# Patient Record
Sex: FEMALE | Race: WHITE
[De-identification: names, ages, dates, MRNs, and addresses within clinical notes are randomized per-mention and may not be internally consistent; named-entity substitution may affect disease eponyms.]

---

## 2020-03-02 ENCOUNTER — HOSPITAL ENCOUNTER (INPATIENT)
Dept: HOSPITAL 90 - EDH | Age: 80
LOS: 4 days | Discharge: NURSING, REHAB OR LONG-TERM CARE | DRG: 470 | End: 2020-03-06
Attending: INTERNAL MEDICINE | Admitting: INTERNAL MEDICINE
Payer: MEDICARE

## 2020-03-02 VITALS — HEIGHT: 63 IN | WEIGHT: 156 LBS | BODY MASS INDEX: 27.64 KG/M2

## 2020-03-02 DIAGNOSIS — B96.20: ICD-10-CM

## 2020-03-02 DIAGNOSIS — N39.0: ICD-10-CM

## 2020-03-02 DIAGNOSIS — S72.002A: Primary | ICD-10-CM

## 2020-03-02 DIAGNOSIS — E03.9: ICD-10-CM

## 2020-03-02 DIAGNOSIS — Z88.1: ICD-10-CM

## 2020-03-02 DIAGNOSIS — Z79.899: ICD-10-CM
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
## 2020-03-02 DIAGNOSIS — E66.9: ICD-10-CM

## 2020-03-02 DIAGNOSIS — R55: ICD-10-CM

## 2020-03-02 DIAGNOSIS — Z53.20: ICD-10-CM

## 2020-03-02 DIAGNOSIS — F41.1: ICD-10-CM
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
## 2020-03-02 DIAGNOSIS — I10: ICD-10-CM
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
## 2020-03-02 DIAGNOSIS — Z16.23: ICD-10-CM

## 2020-03-02 LAB
ALBUMIN SERPL-MCNC: 3.6 G/DL (ref 3.5–5)
ALT SERPL-CCNC: 17 U/L (ref 12–78)
APTT PPP: 24.8 SEC (ref 26.3–35.5)
AST SERPL-CCNC: 20 U/L (ref 10–37)
BASOPHILS NFR BLD AUTO: 0.5 % (ref 0–5)
BILIRUB SERPL-MCNC: 0.2 MG/DL (ref 0.2–1)
BUN SERPL-MCNC: 15 MG/DL (ref 7–18)
CHLORIDE SERPL-SCNC: 101 MMOL/L (ref 101–111)
CK SERPL-CCNC: 80 U/L (ref 21–232)
CO2 SERPL-SCNC: 30 MMOL/L (ref 21–32)
CREAT SERPL-MCNC: 1 MG/DL (ref 0.5–1.5)
EOSINOPHIL NFR BLD AUTO: 2.2 % (ref 0–8)
ERYTHROCYTE [DISTWIDTH] IN BLOOD BY AUTOMATED COUNT: 13.2 % (ref 11–15.5)
GFR SERPL CREATININE-BSD FRML MDRD: 57 ML/MIN (ref 60–?)
GLUCOSE SERPL-MCNC: 100 MG/DL (ref 70–105)
HCT VFR BLD AUTO: 37.6 % (ref 36–48)
INR PPP: 0.95 (ref 0.85–1.15)
KETONES UR STRIP-MCNC: 15 MG/DL
LYMPHOCYTES NFR SPEC AUTO: 36.6 % (ref 21–51)
MCH RBC QN AUTO: 29.2 PG (ref 27–33)
MCHC RBC AUTO-ENTMCNC: 32.2 G/DL (ref 32–36)
MCV RBC AUTO: 90.8 FL (ref 79–99)
MONOCYTES NFR BLD AUTO: 9.2 % (ref 3–13)
NEUTROPHILS NFR BLD AUTO: 51.2 % (ref 40–77)
NRBC BLD MANUAL-RTO: 0 % (ref 0–0.19)
PH UR STRIP: 7.5 [PH] (ref 5–8)
PLATELET # BLD AUTO: 208 K/UL (ref 130–400)
POTASSIUM SERPL-SCNC: 3.6 MMOL/L (ref 3.5–5.1)
PROT SERPL-MCNC: 7.2 G/DL (ref 6–8.3)
PROTHROMBIN TIME: 10 SEC (ref 9.6–11.6)
RBC # BLD AUTO: 4.14 MIL/UL (ref 4–5.5)
RBC #/AREA URNS HPF: (no result) /HPF (ref 0–1)
SODIUM SERPL-SCNC: 138 MMOL/L (ref 136–145)
SP GR UR STRIP: 1.01 (ref 1–1.03)
UROBILINOGEN UR STRIP-MCNC: 0.2 MG/DL (ref 0.2–1)
WBC # BLD AUTO: 6.5 K/UL (ref 4.8–10.8)
WBC #/AREA URNS HPF: (no result) /HPF (ref 0–1)

## 2020-03-02 PROCEDURE — 81001 URINALYSIS AUTO W/SCOPE: CPT

## 2020-03-02 PROCEDURE — 86850 RBC ANTIBODY SCREEN: CPT

## 2020-03-02 PROCEDURE — 86900 BLOOD TYPING SEROLOGIC ABO: CPT

## 2020-03-02 PROCEDURE — 87077 CULTURE AEROBIC IDENTIFY: CPT

## 2020-03-02 PROCEDURE — 97039 UNLISTED MODALITY: CPT

## 2020-03-02 PROCEDURE — 80053 COMPREHEN METABOLIC PANEL: CPT

## 2020-03-02 PROCEDURE — 86901 BLOOD TYPING SEROLOGIC RH(D): CPT

## 2020-03-02 PROCEDURE — 85610 PROTHROMBIN TIME: CPT

## 2020-03-02 PROCEDURE — 73503 X-RAY EXAM HIP UNI 4/> VIEWS: CPT

## 2020-03-02 PROCEDURE — 71045 X-RAY EXAM CHEST 1 VIEW: CPT

## 2020-03-02 PROCEDURE — 87186 SC STD MICRODIL/AGAR DIL: CPT

## 2020-03-02 PROCEDURE — 84443 ASSAY THYROID STIM HORMONE: CPT

## 2020-03-02 PROCEDURE — 85027 COMPLETE CBC AUTOMATED: CPT

## 2020-03-02 PROCEDURE — 87088 URINE BACTERIA CULTURE: CPT

## 2020-03-02 PROCEDURE — 93005 ELECTROCARDIOGRAM TRACING: CPT

## 2020-03-02 PROCEDURE — 80048 BASIC METABOLIC PNL TOTAL CA: CPT

## 2020-03-02 PROCEDURE — 85730 THROMBOPLASTIN TIME PARTIAL: CPT

## 2020-03-02 PROCEDURE — 88304 TISSUE EXAM BY PATHOLOGIST: CPT

## 2020-03-02 PROCEDURE — 82550 ASSAY OF CK (CPK): CPT

## 2020-03-02 PROCEDURE — 84484 ASSAY OF TROPONIN QUANT: CPT

## 2020-03-02 PROCEDURE — 85025 COMPLETE CBC W/AUTO DIFF WBC: CPT

## 2020-03-02 PROCEDURE — 88311 DECALCIFY TISSUE: CPT

## 2020-03-02 PROCEDURE — 36415 COLL VENOUS BLD VENIPUNCTURE: CPT

## 2020-03-02 PROCEDURE — 73502 X-RAY EXAM HIP UNI 2-3 VIEWS: CPT

## 2020-03-03 VITALS — DIASTOLIC BLOOD PRESSURE: 78 MMHG | SYSTOLIC BLOOD PRESSURE: 163 MMHG

## 2020-03-03 VITALS — DIASTOLIC BLOOD PRESSURE: 55 MMHG | SYSTOLIC BLOOD PRESSURE: 135 MMHG

## 2020-03-03 VITALS — DIASTOLIC BLOOD PRESSURE: 82 MMHG | SYSTOLIC BLOOD PRESSURE: 167 MMHG

## 2020-03-03 VITALS — SYSTOLIC BLOOD PRESSURE: 167 MMHG | DIASTOLIC BLOOD PRESSURE: 84 MMHG

## 2020-03-03 VITALS — SYSTOLIC BLOOD PRESSURE: 145 MMHG | DIASTOLIC BLOOD PRESSURE: 59 MMHG

## 2020-03-03 VITALS — SYSTOLIC BLOOD PRESSURE: 149 MMHG | DIASTOLIC BLOOD PRESSURE: 65 MMHG

## 2020-03-03 VITALS — SYSTOLIC BLOOD PRESSURE: 162 MMHG | DIASTOLIC BLOOD PRESSURE: 74 MMHG

## 2020-03-03 RX ADMIN — FAMOTIDINE SCH MG: 20 TABLET, FILM COATED ORAL at 10:34

## 2020-03-03 RX ADMIN — MORPHINE SULFATE PRN MG: 2 INJECTION, SOLUTION INTRAMUSCULAR; INTRAVENOUS at 15:22

## 2020-03-03 RX ADMIN — ATENOLOL SCH MG: 25 TABLET ORAL at 10:34

## 2020-03-03 RX ADMIN — KETOROLAC TROMETHAMINE PRN MG: 30 INJECTION, SOLUTION INTRAMUSCULAR; INTRAVENOUS at 05:59

## 2020-03-03 RX ADMIN — SODIUM CHLORIDE SCH GM: 9 INJECTION, SOLUTION INTRAVENOUS at 12:12

## 2020-03-03 RX ADMIN — Medication SCH MG: at 20:03

## 2020-03-03 RX ADMIN — MORPHINE SULFATE PRN MG: 2 INJECTION, SOLUTION INTRAMUSCULAR; INTRAVENOUS at 20:06

## 2020-03-03 RX ADMIN — KETOROLAC TROMETHAMINE PRN MG: 30 INJECTION, SOLUTION INTRAMUSCULAR; INTRAVENOUS at 14:31

## 2020-03-03 RX ADMIN — ATENOLOL SCH MG: 25 TABLET ORAL at 20:04

## 2020-03-03 RX ADMIN — Medication SCH MG: at 10:34

## 2020-03-03 NOTE — NUR
ROUNDS

PATIENT AWAKE AND ALERT IN BED WATCHING TV WITH SPOUSE AT BEDSIDE. MEDICATED FOR COMPLAINTS 
OF PAIN VOICED AT THIS TIME TO LEFT HIP. PATIENT STATES SHE WANTS TO BE MEDICATED SO HER 
PAIN DOES NOT GET WORSE.  RESP EVEN AND UNLABORED. NO SOB NOTED. ON ROOM AIR. TOLERATING PO 
DIET WELL. NO NAUSEA OR VOMITING NOTED. BEDREST. FALL PRECAUTIONS IN PLACE. INITIAL DOSE OF 
LOVENOX GIVEN. PATIENT VERBALIZED UNDERSTANDING OF MEDICATION GIVEN. LEES PATENT AND 
DRAINING CLEAR PALE YELLOW URINE. NO SIGNS OF DISTRESS NOTED. CALL LIGHT WITHIN REACH. WILL 
CONTINUE TO MONITOR. 

-------------------------------------------------------------------------------

Addendum: 03/04/20 at 0051 by MARCELL PALACIOS RN RN

-------------------------------------------------------------------------------

Amended: Links added.

## 2020-03-03 NOTE — NUR
PATIENT RECEIVED FROM ER WITH A DIAGNOSIS OF A LEFT HIP FRACTURE, S/P FALL INJURY, AND ACUTE 
UTI UNDER THE CARE OF . PATIENT AWAKE AND ALERT. VOICES ALL NEEDS. NO 
COMPLAINTS OF PAIN VOICED AT THIS TIME. VITALS STABLE. AFEBRILE. RESP EVEN AND UNLABORED. NO 
SOB NOTED. ON ROOM AIR. 18 FR LEES CATHETER PATENT AND DRAINING CLEAR PALE YELLOW URINE TO 
BSD. TELEMETRY MONITOR ON. NORMAL SINUS RHYTHM. HEAD TO TOE ASSESSMENT DONE. SPOUSE AT 
BEDSIDE. BEDREST. FALL PRECAUTIONS IN PLACE DUE TO POOR SAFETY AWARENESS. CALL LIGHT WITHIN 
REACH. WILL CONTINUE TO MONITOR.  

-------------------------------------------------------------------------------

Addendum: 03/03/20 at 0657 by MARCELL PALACIOS RN RN

-------------------------------------------------------------------------------

Amended: Links added.

## 2020-03-03 NOTE — NUR
CM NOTE- DC PLANNING

Met w patient and spouse Kaden  for dc planning. face sheet verified - corrections 
made/faxed

patient   and jared are from [facility] near [place], returning end  of April. They live in 
[facility], home has 3  steps up to home- pt states she is independent, active, 
drives occaisionally, and uses no mobility aids. 



Pt with fractured hip, "may need a replacement' as per patient,

Advised both re recommendations for rehab post procedure.  

[facility] would be first choice;tagged chart. Will [place] back and revisit after surgery. 

-------------------------------------------------------------------------------

Addendum: 03/03/20 at 1347 by SORIN HAN RN CM

-------------------------------------------------------------------------------

Amended: Links added.

## 2020-03-04 VITALS — DIASTOLIC BLOOD PRESSURE: 53 MMHG | SYSTOLIC BLOOD PRESSURE: 132 MMHG

## 2020-03-04 VITALS — SYSTOLIC BLOOD PRESSURE: 131 MMHG | DIASTOLIC BLOOD PRESSURE: 52 MMHG

## 2020-03-04 VITALS — DIASTOLIC BLOOD PRESSURE: 70 MMHG | SYSTOLIC BLOOD PRESSURE: 142 MMHG

## 2020-03-04 VITALS — SYSTOLIC BLOOD PRESSURE: 134 MMHG | DIASTOLIC BLOOD PRESSURE: 63 MMHG

## 2020-03-04 VITALS — DIASTOLIC BLOOD PRESSURE: 63 MMHG | SYSTOLIC BLOOD PRESSURE: 151 MMHG

## 2020-03-04 VITALS — SYSTOLIC BLOOD PRESSURE: 143 MMHG | DIASTOLIC BLOOD PRESSURE: 52 MMHG

## 2020-03-04 VITALS — DIASTOLIC BLOOD PRESSURE: 61 MMHG | SYSTOLIC BLOOD PRESSURE: 142 MMHG

## 2020-03-04 VITALS — SYSTOLIC BLOOD PRESSURE: 130 MMHG | DIASTOLIC BLOOD PRESSURE: 45 MMHG

## 2020-03-04 VITALS — DIASTOLIC BLOOD PRESSURE: 34 MMHG | SYSTOLIC BLOOD PRESSURE: 103 MMHG

## 2020-03-04 VITALS — SYSTOLIC BLOOD PRESSURE: 136 MMHG | DIASTOLIC BLOOD PRESSURE: 73 MMHG

## 2020-03-04 VITALS — DIASTOLIC BLOOD PRESSURE: 66 MMHG | SYSTOLIC BLOOD PRESSURE: 133 MMHG

## 2020-03-04 VITALS — SYSTOLIC BLOOD PRESSURE: 145 MMHG | DIASTOLIC BLOOD PRESSURE: 68 MMHG

## 2020-03-04 VITALS — SYSTOLIC BLOOD PRESSURE: 131 MMHG | DIASTOLIC BLOOD PRESSURE: 54 MMHG

## 2020-03-04 VITALS — DIASTOLIC BLOOD PRESSURE: 38 MMHG | SYSTOLIC BLOOD PRESSURE: 112 MMHG

## 2020-03-04 VITALS — DIASTOLIC BLOOD PRESSURE: 70 MMHG | SYSTOLIC BLOOD PRESSURE: 134 MMHG

## 2020-03-04 VITALS — DIASTOLIC BLOOD PRESSURE: 66 MMHG | SYSTOLIC BLOOD PRESSURE: 150 MMHG

## 2020-03-04 VITALS — SYSTOLIC BLOOD PRESSURE: 130 MMHG | DIASTOLIC BLOOD PRESSURE: 47 MMHG

## 2020-03-04 VITALS — DIASTOLIC BLOOD PRESSURE: 62 MMHG | SYSTOLIC BLOOD PRESSURE: 146 MMHG

## 2020-03-04 VITALS — DIASTOLIC BLOOD PRESSURE: 49 MMHG | SYSTOLIC BLOOD PRESSURE: 129 MMHG

## 2020-03-04 VITALS — SYSTOLIC BLOOD PRESSURE: 127 MMHG | DIASTOLIC BLOOD PRESSURE: 46 MMHG

## 2020-03-04 VITALS — DIASTOLIC BLOOD PRESSURE: 58 MMHG | SYSTOLIC BLOOD PRESSURE: 141 MMHG

## 2020-03-04 VITALS — SYSTOLIC BLOOD PRESSURE: 135 MMHG | DIASTOLIC BLOOD PRESSURE: 47 MMHG

## 2020-03-04 VITALS — DIASTOLIC BLOOD PRESSURE: 39 MMHG | SYSTOLIC BLOOD PRESSURE: 115 MMHG

## 2020-03-04 VITALS — SYSTOLIC BLOOD PRESSURE: 134 MMHG | DIASTOLIC BLOOD PRESSURE: 49 MMHG

## 2020-03-04 VITALS — DIASTOLIC BLOOD PRESSURE: 46 MMHG | SYSTOLIC BLOOD PRESSURE: 133 MMHG

## 2020-03-04 LAB
ALBUMIN SERPL-MCNC: 2.9 G/DL (ref 3.5–5)
ALT SERPL-CCNC: 16 U/L (ref 12–78)
AST SERPL-CCNC: 16 U/L (ref 10–37)
BASOPHILS NFR BLD AUTO: 0.3 % (ref 0–5)
BILIRUB SERPL-MCNC: 0.5 MG/DL (ref 0.2–1)
BUN SERPL-MCNC: 14 MG/DL (ref 7–18)
CHLORIDE SERPL-SCNC: 94 MMOL/L (ref 101–111)
CO2 SERPL-SCNC: 29 MMOL/L (ref 21–32)
CREAT SERPL-MCNC: 0.8 MG/DL (ref 0.5–1.5)
EOSINOPHIL NFR BLD AUTO: 1.9 % (ref 0–8)
ERYTHROCYTE [DISTWIDTH] IN BLOOD BY AUTOMATED COUNT: 13 % (ref 11–15.5)
GFR SERPL CREATININE-BSD FRML MDRD: 74 ML/MIN (ref 60–?)
GLUCOSE SERPL-MCNC: 86 MG/DL (ref 70–105)
HCT VFR BLD AUTO: 35 % (ref 36–48)
LYMPHOCYTES NFR SPEC AUTO: 24.2 % (ref 21–51)
MCH RBC QN AUTO: 29.2 PG (ref 27–33)
MCHC RBC AUTO-ENTMCNC: 33.1 G/DL (ref 32–36)
MCV RBC AUTO: 88.2 FL (ref 79–99)
MONOCYTES NFR BLD AUTO: 8.3 % (ref 3–13)
NEUTROPHILS NFR BLD AUTO: 65 % (ref 40–77)
NRBC BLD MANUAL-RTO: 0 % (ref 0–0.19)
PLATELET # BLD AUTO: 201 K/UL (ref 130–400)
POTASSIUM SERPL-SCNC: 3.2 MMOL/L (ref 3.5–5.1)
PROT SERPL-MCNC: 6.3 G/DL (ref 6–8.3)
RBC # BLD AUTO: 3.97 MIL/UL (ref 4–5.5)
SODIUM SERPL-SCNC: 131 MMOL/L (ref 136–145)
TSH SERPL DL<=0.05 MIU/L-ACNC: 10.59 UIU/ML (ref 0.36–3.74)
WBC # BLD AUTO: 6.2 K/UL (ref 4.8–10.8)

## 2020-03-04 PROCEDURE — 3E0T3BZ INTRODUCTION OF ANESTHETIC AGENT INTO PERIPHERAL NERVES AND PLEXI, PERCUTANEOUS APPROACH: ICD-10-PCS | Performed by: ORTHOPAEDIC SURGERY

## 2020-03-04 PROCEDURE — 0SRS0J9 REPLACEMENT OF LEFT HIP JOINT, FEMORAL SURFACE WITH SYNTHETIC SUBSTITUTE, CEMENTED, OPEN APPROACH: ICD-10-PCS | Performed by: ORTHOPAEDIC SURGERY

## 2020-03-04 RX ADMIN — SODIUM CHLORIDE SCH MLS/HR: 0.9 INJECTION, SOLUTION INTRAVENOUS at 23:14

## 2020-03-04 RX ADMIN — FAMOTIDINE SCH MG: 20 TABLET, FILM COATED ORAL at 09:00

## 2020-03-04 RX ADMIN — ACETAMINOPHEN SCH MG: 500 TABLET, COATED ORAL at 23:14

## 2020-03-04 RX ADMIN — SODIUM CHLORIDE PRN GM: 9 INJECTION, SOLUTION INTRAVENOUS at 09:56

## 2020-03-04 RX ADMIN — Medication SCH MG: at 09:00

## 2020-03-04 RX ADMIN — ATENOLOL SCH MG: 25 TABLET ORAL at 20:04

## 2020-03-04 RX ADMIN — Medication SCH MG: at 20:04

## 2020-03-04 RX ADMIN — CELECOXIB SCH MG: 200 CAPSULE ORAL at 20:04

## 2020-03-04 RX ADMIN — SODIUM CHLORIDE SCH GM: 9 INJECTION, SOLUTION INTRAVENOUS at 20:04

## 2020-03-04 RX ADMIN — SODIUM CHLORIDE SCH MLS/HR: 0.9 INJECTION, SOLUTION INTRAVENOUS at 14:31

## 2020-03-04 RX ADMIN — SODIUM CHLORIDE PRN GM: 9 INJECTION, SOLUTION INTRAVENOUS at 11:57

## 2020-03-04 RX ADMIN — POTASSIUM CHLORIDE PRN MEQ: 1500 TABLET, EXTENDED RELEASE ORAL at 23:16

## 2020-03-04 RX ADMIN — SODIUM CHLORIDE SCH GM: 9 INJECTION, SOLUTION INTRAVENOUS at 01:41

## 2020-03-04 RX ADMIN — SODIUM CHLORIDE SCH GM: 9 INJECTION, SOLUTION INTRAVENOUS at 13:00

## 2020-03-04 RX ADMIN — ASPIRIN SCH MG: 81 TABLET, CHEWABLE ORAL at 20:07

## 2020-03-04 RX ADMIN — SODIUM CHLORIDE SCH GM: 9 INJECTION, SOLUTION INTRAVENOUS at 13:08

## 2020-03-04 RX ADMIN — ATENOLOL SCH MG: 25 TABLET ORAL at 09:00

## 2020-03-04 RX ADMIN — POTASSIUM CHLORIDE PRN MEQ: 1500 TABLET, EXTENDED RELEASE ORAL at 15:49

## 2020-03-04 RX ADMIN — PREGABALIN SCH MG: 25 CAPSULE ORAL at 20:04

## 2020-03-04 RX ADMIN — FAMOTIDINE SCH MG: 20 TABLET, FILM COATED ORAL at 20:04

## 2020-03-04 RX ADMIN — MORPHINE SULFATE PRN MG: 2 INJECTION, SOLUTION INTRAMUSCULAR; INTRAVENOUS at 02:06

## 2020-03-04 RX ADMIN — MORPHINE SULFATE PRN MG: 2 INJECTION, SOLUTION INTRAMUSCULAR; INTRAVENOUS at 15:48

## 2020-03-04 RX ADMIN — ACETAMINOPHEN SCH MG: 500 TABLET, COATED ORAL at 15:57

## 2020-03-04 NOTE — NUR
TRANSFERRED TO [facility] FOR SURGERY BY MATTHEW TURNER AND TECH

PT IN NO DISTRESS, RESP EVEN, UNLABORED. REMOVED DENTURES AND GLASSES, LEFT AT BEDSIDE. 
 ACCOMPANIED PT.

## 2020-03-04 NOTE — NUR
RE:  ON CALL IVPBs ORDERED - CALLED AND NOTIFIED JOHNNY CASTRO

and notified of Ancef and Tranexamic Acid on call IVPB pending to give as orderd. MATTHEW 
VERBALIZED UNDERSTANDING.

## 2020-03-05 VITALS — SYSTOLIC BLOOD PRESSURE: 120 MMHG | DIASTOLIC BLOOD PRESSURE: 54 MMHG

## 2020-03-05 VITALS — DIASTOLIC BLOOD PRESSURE: 63 MMHG | SYSTOLIC BLOOD PRESSURE: 135 MMHG

## 2020-03-05 VITALS — DIASTOLIC BLOOD PRESSURE: 30 MMHG | SYSTOLIC BLOOD PRESSURE: 127 MMHG

## 2020-03-05 VITALS — DIASTOLIC BLOOD PRESSURE: 48 MMHG | SYSTOLIC BLOOD PRESSURE: 92 MMHG

## 2020-03-05 VITALS — SYSTOLIC BLOOD PRESSURE: 133 MMHG | DIASTOLIC BLOOD PRESSURE: 59 MMHG

## 2020-03-05 VITALS — SYSTOLIC BLOOD PRESSURE: 125 MMHG | DIASTOLIC BLOOD PRESSURE: 62 MMHG

## 2020-03-05 LAB
BUN SERPL-MCNC: 9 MG/DL (ref 7–18)
CHLORIDE SERPL-SCNC: 104 MMOL/L (ref 101–111)
CO2 SERPL-SCNC: 25 MMOL/L (ref 21–32)
CREAT SERPL-MCNC: 0.7 MG/DL (ref 0.5–1.5)
ERYTHROCYTE [DISTWIDTH] IN BLOOD BY AUTOMATED COUNT: 13.1 % (ref 11–15.5)
GFR SERPL CREATININE-BSD FRML MDRD: 86 ML/MIN (ref 60–?)
GLUCOSE SERPL-MCNC: 147 MG/DL (ref 70–105)
HCT VFR BLD AUTO: 31.2 % (ref 36–48)
MCH RBC QN AUTO: 29.3 PG (ref 27–33)
MCHC RBC AUTO-ENTMCNC: 33 G/DL (ref 32–36)
MCV RBC AUTO: 88.9 FL (ref 79–99)
NRBC BLD MANUAL-RTO: 0 % (ref 0–0.19)
PLATELET # BLD AUTO: 174 K/UL (ref 130–400)
POTASSIUM SERPL-SCNC: 4.4 MMOL/L (ref 3.5–5.1)
RBC # BLD AUTO: 3.51 MIL/UL (ref 4–5.5)
SODIUM SERPL-SCNC: 136 MMOL/L (ref 136–145)
WBC # BLD AUTO: 8.7 K/UL (ref 4.8–10.8)

## 2020-03-05 RX ADMIN — SODIUM CHLORIDE SCH MLS/HR: 0.9 INJECTION, SOLUTION INTRAVENOUS at 10:31

## 2020-03-05 RX ADMIN — CALCIUM PRN MG: 500 TABLET ORAL at 23:14

## 2020-03-05 RX ADMIN — ATENOLOL SCH MG: 25 TABLET ORAL at 08:43

## 2020-03-05 RX ADMIN — FAMOTIDINE SCH MG: 20 TABLET, FILM COATED ORAL at 19:44

## 2020-03-05 RX ADMIN — SODIUM CHLORIDE SCH GM: 9 INJECTION, SOLUTION INTRAVENOUS at 12:29

## 2020-03-05 RX ADMIN — CELECOXIB SCH MG: 200 CAPSULE ORAL at 08:43

## 2020-03-05 RX ADMIN — PREGABALIN SCH MG: 25 CAPSULE ORAL at 08:42

## 2020-03-05 RX ADMIN — CALCIUM PRN MG: 500 TABLET ORAL at 12:29

## 2020-03-05 RX ADMIN — Medication SCH MG: at 08:43

## 2020-03-05 RX ADMIN — SODIUM CHLORIDE SCH GM: 9 INJECTION, SOLUTION INTRAVENOUS at 04:05

## 2020-03-05 RX ADMIN — Medication SCH GM: at 08:43

## 2020-03-05 RX ADMIN — ACETAMINOPHEN SCH MG: 500 TABLET, COATED ORAL at 06:29

## 2020-03-05 RX ADMIN — ASPIRIN SCH MG: 81 TABLET, CHEWABLE ORAL at 08:40

## 2020-03-05 RX ADMIN — SODIUM CHLORIDE SCH GM: 9 INJECTION, SOLUTION INTRAVENOUS at 23:14

## 2020-03-05 RX ADMIN — ACETAMINOPHEN SCH MG: 500 TABLET, COATED ORAL at 13:07

## 2020-03-05 RX ADMIN — ATENOLOL SCH MG: 25 TABLET ORAL at 21:00

## 2020-03-05 RX ADMIN — ACETAMINOPHEN SCH MG: 500 TABLET, COATED ORAL at 22:47

## 2020-03-05 RX ADMIN — OXYCODONE HYDROCHLORIDE PRN MG: 5 TABLET ORAL at 13:06

## 2020-03-05 RX ADMIN — ASPIRIN SCH MG: 81 TABLET, CHEWABLE ORAL at 19:44

## 2020-03-05 RX ADMIN — CELECOXIB SCH MG: 200 CAPSULE ORAL at 19:44

## 2020-03-05 RX ADMIN — FAMOTIDINE SCH MG: 20 TABLET, FILM COATED ORAL at 08:48

## 2020-03-05 RX ADMIN — LEVOTHYROXINE SODIUM SCH MCG: 25 TABLET ORAL at 06:28

## 2020-03-05 RX ADMIN — CALCIUM PRN MG: 500 TABLET ORAL at 22:47

## 2020-03-05 RX ADMIN — FAMOTIDINE SCH MG: 20 TABLET, FILM COATED ORAL at 08:43

## 2020-03-05 RX ADMIN — Medication SCH MG: at 19:45

## 2020-03-05 RX ADMIN — PREGABALIN SCH MG: 25 CAPSULE ORAL at 19:44

## 2020-03-05 RX ADMIN — OXYCODONE HYDROCHLORIDE PRN MG: 5 TABLET ORAL at 08:42

## 2020-03-06 VITALS — SYSTOLIC BLOOD PRESSURE: 138 MMHG | DIASTOLIC BLOOD PRESSURE: 74 MMHG

## 2020-03-06 VITALS — DIASTOLIC BLOOD PRESSURE: 67 MMHG | SYSTOLIC BLOOD PRESSURE: 117 MMHG

## 2020-03-06 VITALS — SYSTOLIC BLOOD PRESSURE: 128 MMHG | DIASTOLIC BLOOD PRESSURE: 56 MMHG

## 2020-03-06 VITALS — SYSTOLIC BLOOD PRESSURE: 146 MMHG | DIASTOLIC BLOOD PRESSURE: 66 MMHG

## 2020-03-06 LAB
BASOPHILS NFR BLD AUTO: 0.3 % (ref 0–5)
BUN SERPL-MCNC: 17 MG/DL (ref 7–18)
CHLORIDE SERPL-SCNC: 101 MMOL/L (ref 101–111)
CO2 SERPL-SCNC: 29 MMOL/L (ref 21–32)
CREAT SERPL-MCNC: 1 MG/DL (ref 0.5–1.5)
EOSINOPHIL NFR BLD AUTO: 4.4 % (ref 0–8)
ERYTHROCYTE [DISTWIDTH] IN BLOOD BY AUTOMATED COUNT: 13.2 % (ref 11–15.5)
GFR SERPL CREATININE-BSD FRML MDRD: 57 ML/MIN (ref 60–?)
GLUCOSE SERPL-MCNC: 86 MG/DL (ref 70–105)
HCT VFR BLD AUTO: 28.5 % (ref 36–48)
LYMPHOCYTES NFR SPEC AUTO: 19.9 % (ref 21–51)
MCH RBC QN AUTO: 29 PG (ref 27–33)
MCHC RBC AUTO-ENTMCNC: 32.3 G/DL (ref 32–36)
MCV RBC AUTO: 89.9 FL (ref 79–99)
MONOCYTES NFR BLD AUTO: 8.6 % (ref 3–13)
NEUTROPHILS NFR BLD AUTO: 66.4 % (ref 40–77)
NRBC BLD MANUAL-RTO: 0 % (ref 0–0.19)
PLATELET # BLD AUTO: 153 K/UL (ref 130–400)
POTASSIUM SERPL-SCNC: 4 MMOL/L (ref 3.5–5.1)
RBC # BLD AUTO: 3.17 MIL/UL (ref 4–5.5)
SODIUM SERPL-SCNC: 135 MMOL/L (ref 136–145)
WBC # BLD AUTO: 7 K/UL (ref 4.8–10.8)

## 2020-03-06 RX ADMIN — ACETAMINOPHEN SCH MG: 500 TABLET, COATED ORAL at 05:56

## 2020-03-06 RX ADMIN — ATENOLOL SCH MG: 25 TABLET ORAL at 09:00

## 2020-03-06 RX ADMIN — FAMOTIDINE SCH MG: 20 TABLET, FILM COATED ORAL at 09:09

## 2020-03-06 RX ADMIN — ACETAMINOPHEN SCH MG: 500 TABLET, COATED ORAL at 13:58

## 2020-03-06 RX ADMIN — PREGABALIN SCH MG: 25 CAPSULE ORAL at 09:07

## 2020-03-06 RX ADMIN — CALCIUM PRN MG: 500 TABLET ORAL at 09:08

## 2020-03-06 RX ADMIN — Medication SCH MG: at 09:08

## 2020-03-06 RX ADMIN — LEVOTHYROXINE SODIUM SCH MCG: 25 TABLET ORAL at 05:56

## 2020-03-06 RX ADMIN — CELECOXIB SCH MG: 200 CAPSULE ORAL at 09:07

## 2020-03-06 RX ADMIN — Medication SCH GM: at 09:09

## 2020-03-06 RX ADMIN — ASPIRIN SCH MG: 81 TABLET, CHEWABLE ORAL at 09:08

## 2020-03-06 RX ADMIN — SODIUM CHLORIDE SCH GM: 9 INJECTION, SOLUTION INTRAVENOUS at 10:41

## 2020-03-06 RX ADMIN — FAMOTIDINE SCH MG: 20 TABLET, FILM COATED ORAL at 09:00

## 2020-03-06 NOTE — NUR
ACCETPED AT [facility] IN CHART

 NEEDS SIGNING BY HOUSE.

 EMS FORM NEEDS FAXING 

PRIMARY RN AWARE

## 2020-03-06 NOTE — NUR
DISCHARGE

PATIENT GIVEN DISCHARGE INSTRUCTIONS AND EDUCATION ON FOLLOW UP APPOINTMENTS, WOODY DRESSING 
CARE, WBAT, RX ASPIRIN, TORADOL. PATIENT VERBALIZED UNDERSTANDING OF ALL EDUCATION GIVEN VIA 
TEACH BACK, REFERENCE MATERIAL PROVIDED. WOODY DRESSING CHANGED, SEAL INTACT, NO LEAK, 
NEGATIVE PRESSURE ACTIVE. IV DISCONTINUED CATHETER INTACT. NO DISTRESS NOTED UPON DISCHARGE, 
PATIENT LEFT VIA EMS. 



REPORT GIVEN TO MARY MITCHELL RN ON FOLLOW UP APPOINTMENTS, WOODY DRESSING CARE AND REMOVAL 
ON 3/11/20, WBAT, RX ASPIRIN, TORADOL.